# Patient Record
Sex: MALE | Race: WHITE | Employment: OTHER | URBAN - METROPOLITAN AREA
[De-identification: names, ages, dates, MRNs, and addresses within clinical notes are randomized per-mention and may not be internally consistent; named-entity substitution may affect disease eponyms.]

---

## 2017-05-12 PROBLEM — I21.4 NSTEMI (NON-ST ELEVATED MYOCARDIAL INFARCTION) (HCC): Status: ACTIVE | Noted: 2017-05-12

## 2017-08-02 PROBLEM — I25.10 CORONARY ARTERY DISEASE INVOLVING NATIVE CORONARY ARTERY OF NATIVE HEART WITHOUT ANGINA PECTORIS: Status: ACTIVE | Noted: 2017-08-02

## 2017-08-02 PROBLEM — M17.0 LOCALIZED OSTEOARTHRITIS OF KNEES, BILATERAL: Status: ACTIVE | Noted: 2017-08-02

## 2017-09-05 PROBLEM — N40.1 BENIGN PROSTATIC HYPERPLASIA WITH LOWER URINARY TRACT SYMPTOMS: Status: ACTIVE | Noted: 2017-09-05

## 2017-11-22 PROBLEM — R33.9 URINARY RETENTION: Status: ACTIVE | Noted: 2017-11-22

## 2017-11-22 PROBLEM — N47.1 PHIMOSIS: Status: ACTIVE | Noted: 2017-11-22

## 2018-05-08 ENCOUNTER — HOSPITAL ENCOUNTER (EMERGENCY)
Age: 83
Discharge: HOME | End: 2018-05-08
Attending: EMERGENCY MEDICINE
Payer: MEDICARE

## 2018-05-08 VITALS
WEIGHT: 160 LBS | DIASTOLIC BLOOD PRESSURE: 70 MMHG | HEART RATE: 82 BPM | SYSTOLIC BLOOD PRESSURE: 135 MMHG | OXYGEN SATURATION: 96 % | TEMPERATURE: 97.5 F | RESPIRATION RATE: 16 BRPM | BODY MASS INDEX: 24.25 KG/M2 | HEIGHT: 68 IN

## 2018-05-08 DIAGNOSIS — T83.9XXA FOLEY CATHETER PROBLEM, INITIAL ENCOUNTER (HCC): ICD-10-CM

## 2018-05-08 DIAGNOSIS — N30.01 ACUTE CYSTITIS WITH HEMATURIA: Primary | ICD-10-CM

## 2018-05-08 LAB
BACTERIA: ABNORMAL /HPF
BILIRUBIN URINE: NEGATIVE
BLOOD, URINE: ABNORMAL
CLARITY: ABNORMAL
COLOR: YELLOW
GLUCOSE URINE: NEGATIVE MG/DL
KETONES, URINE: NEGATIVE MG/DL
LEUKOCYTE ESTERASE, URINE: ABNORMAL
NITRITE, URINE: NEGATIVE
PH UA: 5.5 (ref 5–9)
PROTEIN UA: 30 MG/DL
RBC UA: ABNORMAL /HPF (ref 0–2)
SPECIFIC GRAVITY UA: 1.02 (ref 1–1.03)
UROBILINOGEN, URINE: 1 E.U./DL
WBC UA: >20 /HPF (ref 0–5)

## 2018-05-08 PROCEDURE — 51702 INSERT TEMP BLADDER CATH: CPT

## 2018-05-08 PROCEDURE — 87088 URINE BACTERIA CULTURE: CPT

## 2018-05-08 PROCEDURE — 81001 URINALYSIS AUTO W/SCOPE: CPT

## 2018-05-08 PROCEDURE — 99284 EMERGENCY DEPT VISIT MOD MDM: CPT

## 2018-05-08 RX ORDER — CEFDINIR 300 MG/1
300 CAPSULE ORAL 2 TIMES DAILY
Qty: 28 CAPSULE | Refills: 0 | Status: SHIPPED | OUTPATIENT
Start: 2018-05-08 | End: 2018-05-22

## 2018-05-09 LAB — URINE CULTURE, ROUTINE: NORMAL

## 2018-07-26 ENCOUNTER — OFFICE VISIT (OUTPATIENT)
Dept: CARDIOLOGY CLINIC | Age: 83
End: 2018-07-26
Payer: MEDICARE

## 2018-07-26 VITALS
RESPIRATION RATE: 12 BRPM | SYSTOLIC BLOOD PRESSURE: 102 MMHG | HEIGHT: 70 IN | HEART RATE: 80 BPM | DIASTOLIC BLOOD PRESSURE: 60 MMHG

## 2018-07-26 DIAGNOSIS — R33.9 URINARY RETENTION: ICD-10-CM

## 2018-07-26 DIAGNOSIS — I44.1 MOBITZ TYPE 1 SECOND DEGREE AV BLOCK: ICD-10-CM

## 2018-07-26 DIAGNOSIS — Z96.0 URINARY CATHETER IN PLACE: ICD-10-CM

## 2018-07-26 DIAGNOSIS — I25.10 CORONARY ARTERY DISEASE INVOLVING NATIVE CORONARY ARTERY OF NATIVE HEART WITHOUT ANGINA PECTORIS: Primary | ICD-10-CM

## 2018-07-26 DIAGNOSIS — I10 ESSENTIAL HYPERTENSION: ICD-10-CM

## 2018-07-26 PROCEDURE — 99213 OFFICE O/P EST LOW 20 MIN: CPT | Performed by: INTERNAL MEDICINE

## 2018-07-26 PROCEDURE — 93000 ELECTROCARDIOGRAM COMPLETE: CPT | Performed by: INTERNAL MEDICINE

## 2018-07-26 PROCEDURE — 4040F PNEUMOC VAC/ADMIN/RCVD: CPT | Performed by: INTERNAL MEDICINE

## 2018-07-26 PROCEDURE — G8427 DOCREV CUR MEDS BY ELIG CLIN: HCPCS | Performed by: INTERNAL MEDICINE

## 2018-07-26 PROCEDURE — 1123F ACP DISCUSS/DSCN MKR DOCD: CPT | Performed by: INTERNAL MEDICINE

## 2018-07-26 PROCEDURE — 1101F PT FALLS ASSESS-DOCD LE1/YR: CPT | Performed by: INTERNAL MEDICINE

## 2018-07-26 PROCEDURE — 1036F TOBACCO NON-USER: CPT | Performed by: INTERNAL MEDICINE

## 2018-07-26 PROCEDURE — G8598 ASA/ANTIPLAT THER USED: HCPCS | Performed by: INTERNAL MEDICINE

## 2018-07-26 PROCEDURE — G8420 CALC BMI NORM PARAMETERS: HCPCS | Performed by: INTERNAL MEDICINE

## 2018-07-26 RX ORDER — PHENAZOPYRIDINE HYDROCHLORIDE 100 MG/1
100 TABLET, FILM COATED ORAL 3 TIMES DAILY PRN
COMMUNITY

## 2018-07-26 NOTE — PROGRESS NOTES
OFFICE VISIT     PRIMARY CARE PHYSICIAN:      Lydia Devine MD       ALLERGIES / SENSITIVITIES:      No Known Allergies       REVIEWED MEDICATIONS:        Current Outpatient Prescriptions:     phenazopyridine (PYRIDIUM) 100 MG tablet, Take 100 mg by mouth 3 times daily as needed for Pain, Disp: , Rfl:     atorvastatin (LIPITOR) 40 MG tablet, TAKE 1 TABLET BY MOUTH EVERY NIGHT, Disp: 30 tablet, Rfl: 3    tamsulosin (FLOMAX) 0.4 MG capsule, Take 1 capsule by mouth daily, Disp: 30 capsule, Rfl: 3    metoprolol tartrate (LOPRESSOR) 25 MG tablet, Take 0.5 tablets by mouth 2 times daily, Disp: 60 tablet, Rfl: 3    gabapentin (NEURONTIN) 100 MG capsule, Take 1 capsule by mouth 2 times daily for 30 days. , Disp: 60 capsule, Rfl: 3    aspirin 81 MG tablet, Take 1 tablet by mouth daily (Patient taking differently: Take 81 mg by mouth every morning ), Disp: 30 tablet, Rfl: 11    lansoprazole (PREVACID 24HR) 15 MG capsule, Take 15 mg by mouth every morning , Disp: , Rfl:     docusate sodium (COLACE) 100 MG capsule, Take 100 mg by mouth every morning, Disp: , Rfl:     bisacodyl (DULCOLAX) 5 MG EC tablet, Take 5 mg by mouth every morning, Disp: , Rfl:       S: REASON FOR VISIT:       Chief Complaint   Patient presents with    Coronary Artery Disease     annual          History of Present Illness:       Office Visit for follow up of CAD, HTN   No hospitalizations or surgeries since last visit   Does exercise at home with his Thena Roch any exertional chest pain or short of breath   No palpitations, dizzy or syncope.    No orthopnea   Compliant with all medications       Past Medical History:   Diagnosis Date    Acid reflux     CAD (coronary artery disease)     Hypertension     NSTEMI (non-ST elevated myocardial infarction) ([facility] [address] 05/2017    Sinus bradycardia 05/2017    Post op ACB            Past Surgical History:   Procedure Laterality Date    HERNIA REPAIR      VARICOSE VEIN SURGERY            Family marely            A/P:   ASSESSMENT / PLAN:    Fany Fontenot was seen today for coronary artery disease. Diagnoses and all orders for this visit:    Coronary artery disease involving native coronary artery of native heart without angina pectoris; NSTEMI 5/2017, Continue  ASA, BB, statins  -     EKG 12 Lead    Mobitz type 1 second degree AV block; Occ; asymtoamatic    Essential hypertension; Stable    Urinary retention: follows with Dr Windy Duran    Urinary catheter in place     Preventive Cardiology: Low cholesterol diet, regular exercise as tolerate, and gradual weight loss discussed. Monitor BP and heart rates. D/w his Son   All questions answered about cardiac diagnoses and cardiac medications. Continue current medications. Risk factor modification based on risk profile discussed. Call if any exertional chest pain, short of breath, dizzy or palpitations   Follow up as needed.          [facility]  [address], [place], [address] [facility]  [phone]

## 2018-08-06 ENCOUNTER — HOSPITAL ENCOUNTER (OUTPATIENT)
Age: 83
Discharge: HOME | End: 2018-08-08
Payer: MEDICARE

## 2018-08-06 LAB
AMORPHOUS: ABNORMAL
BACTERIA: ABNORMAL /HPF
BILIRUBIN URINE: NEGATIVE
BLOOD, URINE: ABNORMAL
CLARITY: ABNORMAL
COLOR: YELLOW
CRYSTALS, UA: ABNORMAL
GLUCOSE URINE: NEGATIVE MG/DL
KETONES, URINE: NEGATIVE MG/DL
LEUKOCYTE ESTERASE, URINE: ABNORMAL
NITRITE, URINE: POSITIVE
PH UA: 8.5 (ref 5–9)
PROTEIN UA: 30 MG/DL
RBC UA: ABNORMAL /HPF (ref 0–2)
SPECIFIC GRAVITY UA: <=1.005 (ref 1–1.03)
UROBILINOGEN, URINE: 0.2 E.U./DL
WBC UA: ABNORMAL /HPF (ref 0–5)

## 2018-08-06 PROCEDURE — 87088 URINE BACTERIA CULTURE: CPT

## 2018-08-06 PROCEDURE — 81001 URINALYSIS AUTO W/SCOPE: CPT

## 2018-08-07 LAB — URINE CULTURE, ROUTINE: NORMAL

## 2018-09-24 PROBLEM — I21.4 NSTEMI (NON-ST ELEVATED MYOCARDIAL INFARCTION) (HCC): Status: RESOLVED | Noted: 2017-05-12 | Resolved: 2018-09-24

## 2019-01-01 ENCOUNTER — APPOINTMENT (OUTPATIENT)
Dept: CT IMAGING | Age: 84
End: 2019-01-01
Payer: MEDICARE

## 2019-01-01 ENCOUNTER — HOSPITAL ENCOUNTER (OUTPATIENT)
Age: 84
Setting detail: OBSERVATION
Discharge: HOME | End: 2019-07-27
Attending: EMERGENCY MEDICINE | Admitting: STUDENT IN AN ORGANIZED HEALTH CARE EDUCATION/TRAINING PROGRAM
Payer: MEDICARE

## 2019-01-01 ENCOUNTER — APPOINTMENT (OUTPATIENT)
Dept: GENERAL RADIOLOGY | Age: 84
End: 2019-01-01
Payer: MEDICARE

## 2019-01-01 VITALS
SYSTOLIC BLOOD PRESSURE: 128 MMHG | TEMPERATURE: 97.7 F | BODY MASS INDEX: 25.07 KG/M2 | WEIGHT: 165.4 LBS | RESPIRATION RATE: 16 BRPM | HEIGHT: 68 IN | DIASTOLIC BLOOD PRESSURE: 62 MMHG | OXYGEN SATURATION: 96 % | HEART RATE: 81 BPM

## 2019-01-01 DIAGNOSIS — I48.91 NEW ONSET ATRIAL FIBRILLATION (HCC): Primary | ICD-10-CM

## 2019-01-01 LAB
ALBUMIN SERPL-MCNC: 4 G/DL (ref 3.5–5.2)
ALP BLD-CCNC: 76 U/L (ref 40–129)
ALT SERPL-CCNC: 19 U/L (ref 0–40)
ANION GAP SERPL CALCULATED.3IONS-SCNC: 12 MMOL/L (ref 7–16)
ANION GAP SERPL CALCULATED.3IONS-SCNC: 13 MMOL/L (ref 7–16)
AST SERPL-CCNC: 18 U/L (ref 0–39)
BACTERIA: ABNORMAL /HPF
BASOPHILS ABSOLUTE: 0.08 E9/L (ref 0–0.2)
BASOPHILS RELATIVE PERCENT: 0.6 % (ref 0–2)
BILIRUB SERPL-MCNC: 0.6 MG/DL (ref 0–1.2)
BILIRUBIN URINE: NEGATIVE
BLOOD CULTURE, ROUTINE: NORMAL
BLOOD, URINE: ABNORMAL
BUN BLDV-MCNC: 17 MG/DL (ref 8–23)
BUN BLDV-MCNC: 17 MG/DL (ref 8–23)
CALCIUM SERPL-MCNC: 9 MG/DL (ref 8.6–10.2)
CALCIUM SERPL-MCNC: 9.6 MG/DL (ref 8.6–10.2)
CHLORIDE BLD-SCNC: 102 MMOL/L (ref 98–107)
CHLORIDE BLD-SCNC: 106 MMOL/L (ref 98–107)
CLARITY: CLEAR
CO2: 22 MMOL/L (ref 22–29)
CO2: 23 MMOL/L (ref 22–29)
COLOR: YELLOW
CREAT SERPL-MCNC: 1.2 MG/DL (ref 0.7–1.2)
CREAT SERPL-MCNC: 1.2 MG/DL (ref 0.7–1.2)
CULTURE, BLOOD 2: NORMAL
EKG ATRIAL RATE: 48 BPM
EKG Q-T INTERVAL: 422 MS
EKG QRS DURATION: 112 MS
EKG QTC CALCULATION (BAZETT): 486 MS
EKG R AXIS: -38 DEGREES
EKG T AXIS: 60 DEGREES
EKG VENTRICULAR RATE: 80 BPM
EOSINOPHILS ABSOLUTE: 0.1 E9/L (ref 0.05–0.5)
EOSINOPHILS RELATIVE PERCENT: 0.7 % (ref 0–6)
GFR AFRICAN AMERICAN: >60
GFR AFRICAN AMERICAN: >60
GFR NON-AFRICAN AMERICAN: 56 ML/MIN/1.73
GFR NON-AFRICAN AMERICAN: 56 ML/MIN/1.73
GLUCOSE BLD-MCNC: 149 MG/DL (ref 74–99)
GLUCOSE BLD-MCNC: 96 MG/DL (ref 74–99)
GLUCOSE URINE: NEGATIVE MG/DL
HCT VFR BLD CALC: 42.7 % (ref 37–54)
HCT VFR BLD CALC: 47.5 % (ref 37–54)
HEMOGLOBIN: 14.3 G/DL (ref 12.5–16.5)
HEMOGLOBIN: 16.1 G/DL (ref 12.5–16.5)
IMMATURE GRANULOCYTES #: 0.18 E9/L
IMMATURE GRANULOCYTES %: 1.3 % (ref 0–5)
KETONES, URINE: NEGATIVE MG/DL
LACTIC ACID: 1.8 MMOL/L (ref 0.5–2.2)
LACTIC ACID: 2.7 MMOL/L (ref 0.5–2.2)
LEUKOCYTE ESTERASE, URINE: ABNORMAL
LV EF: 65 %
LVEF MODALITY: NORMAL
LYMPHOCYTES ABSOLUTE: 1.87 E9/L (ref 1.5–4)
LYMPHOCYTES RELATIVE PERCENT: 13.5 % (ref 20–42)
MCH RBC QN AUTO: 30.6 PG (ref 26–35)
MCH RBC QN AUTO: 30.8 PG (ref 26–35)
MCHC RBC AUTO-ENTMCNC: 33.5 % (ref 32–34.5)
MCHC RBC AUTO-ENTMCNC: 33.9 % (ref 32–34.5)
MCV RBC AUTO: 91 FL (ref 80–99.9)
MCV RBC AUTO: 91.4 FL (ref 80–99.9)
MONOCYTES ABSOLUTE: 0.9 E9/L (ref 0.1–0.95)
MONOCYTES RELATIVE PERCENT: 6.5 % (ref 2–12)
NEUTROPHILS ABSOLUTE: 10.72 E9/L (ref 1.8–7.3)
NEUTROPHILS RELATIVE PERCENT: 77.4 % (ref 43–80)
NITRITE, URINE: POSITIVE
ORGANISM: ABNORMAL
ORGANISM: ABNORMAL
PDW BLD-RTO: 13.1 FL (ref 11.5–15)
PDW BLD-RTO: 13.2 FL (ref 11.5–15)
PH UA: 6.5 (ref 5–9)
PLATELET # BLD: 229 E9/L (ref 130–450)
PLATELET # BLD: 267 E9/L (ref 130–450)
PMV BLD AUTO: 9.5 FL (ref 7–12)
PMV BLD AUTO: 9.6 FL (ref 7–12)
POTASSIUM REFLEX MAGNESIUM: 4.4 MMOL/L (ref 3.5–5)
POTASSIUM SERPL-SCNC: 4.2 MMOL/L (ref 3.5–5)
PROTEIN UA: ABNORMAL MG/DL
RBC # BLD: 4.67 E12/L (ref 3.8–5.8)
RBC # BLD: 5.22 E12/L (ref 3.8–5.8)
RBC UA: ABNORMAL /HPF (ref 0–2)
SODIUM BLD-SCNC: 137 MMOL/L (ref 132–146)
SODIUM BLD-SCNC: 141 MMOL/L (ref 132–146)
SPECIFIC GRAVITY UA: 1.01 (ref 1–1.03)
T4 FREE: 1.36 NG/DL (ref 0.93–1.7)
TOTAL PROTEIN: 7.1 G/DL (ref 6.4–8.3)
TROPONIN: <0.01 NG/ML (ref 0–0.03)
TROPONIN: <0.01 NG/ML (ref 0–0.03)
TSH SERPL DL<=0.05 MIU/L-ACNC: 1.2 UIU/ML (ref 0.27–4.2)
URINE CULTURE, ROUTINE: ABNORMAL
UROBILINOGEN, URINE: 1 E.U./DL
WBC # BLD: 12.3 E9/L (ref 4.5–11.5)
WBC # BLD: 13.9 E9/L (ref 4.5–11.5)
WBC UA: ABNORMAL /HPF (ref 0–5)

## 2019-01-01 PROCEDURE — 81001 URINALYSIS AUTO W/SCOPE: CPT

## 2019-01-01 PROCEDURE — 96365 THER/PROPH/DIAG IV INF INIT: CPT

## 2019-01-01 PROCEDURE — 36415 COLL VENOUS BLD VENIPUNCTURE: CPT

## 2019-01-01 PROCEDURE — 84443 ASSAY THYROID STIM HORMONE: CPT

## 2019-01-01 PROCEDURE — 87077 CULTURE AEROBIC IDENTIFY: CPT

## 2019-01-01 PROCEDURE — 84439 ASSAY OF FREE THYROXINE: CPT

## 2019-01-01 PROCEDURE — 93010 ELECTROCARDIOGRAM REPORT: CPT | Performed by: INTERNAL MEDICINE

## 2019-01-01 PROCEDURE — 85025 COMPLETE CBC W/AUTO DIFF WBC: CPT

## 2019-01-01 PROCEDURE — 93005 ELECTROCARDIOGRAM TRACING: CPT | Performed by: NURSE PRACTITIONER

## 2019-01-01 PROCEDURE — 6370000000 HC RX 637 (ALT 250 FOR IP): Performed by: HOSPITALIST

## 2019-01-01 PROCEDURE — 97530 THERAPEUTIC ACTIVITIES: CPT | Performed by: PHYSICAL THERAPIST

## 2019-01-01 PROCEDURE — 83605 ASSAY OF LACTIC ACID: CPT

## 2019-01-01 PROCEDURE — 99218 PR INITIAL OBSERVATION CARE/DAY 30 MINUTES: CPT | Performed by: EMERGENCY MEDICINE

## 2019-01-01 PROCEDURE — 85027 COMPLETE CBC AUTOMATED: CPT

## 2019-01-01 PROCEDURE — 2580000003 HC RX 258: Performed by: STUDENT IN AN ORGANIZED HEALTH CARE EDUCATION/TRAINING PROGRAM

## 2019-01-01 PROCEDURE — 96376 TX/PRO/DX INJ SAME DRUG ADON: CPT

## 2019-01-01 PROCEDURE — 6360000002 HC RX W HCPCS: Performed by: HOSPITALIST

## 2019-01-01 PROCEDURE — 6360000002 HC RX W HCPCS: Performed by: STUDENT IN AN ORGANIZED HEALTH CARE EDUCATION/TRAINING PROGRAM

## 2019-01-01 PROCEDURE — 97530 THERAPEUTIC ACTIVITIES: CPT

## 2019-01-01 PROCEDURE — 84484 ASSAY OF TROPONIN QUANT: CPT

## 2019-01-01 PROCEDURE — 99285 EMERGENCY DEPT VISIT HI MDM: CPT

## 2019-01-01 PROCEDURE — 87088 URINE BACTERIA CULTURE: CPT

## 2019-01-01 PROCEDURE — 2580000003 HC RX 258: Performed by: HOSPITALIST

## 2019-01-01 PROCEDURE — 6360000002 HC RX W HCPCS: Performed by: NURSE PRACTITIONER

## 2019-01-01 PROCEDURE — 70450 CT HEAD/BRAIN W/O DYE: CPT

## 2019-01-01 PROCEDURE — 2580000003 HC RX 258: Performed by: NURSE PRACTITIONER

## 2019-01-01 PROCEDURE — G0378 HOSPITAL OBSERVATION PER HR: HCPCS

## 2019-01-01 PROCEDURE — 97161 PT EVAL LOW COMPLEX 20 MIN: CPT | Performed by: PHYSICAL THERAPIST

## 2019-01-01 PROCEDURE — 87040 BLOOD CULTURE FOR BACTERIA: CPT

## 2019-01-01 PROCEDURE — 97165 OT EVAL LOW COMPLEX 30 MIN: CPT

## 2019-01-01 PROCEDURE — 96372 THER/PROPH/DIAG INJ SC/IM: CPT

## 2019-01-01 PROCEDURE — 6370000000 HC RX 637 (ALT 250 FOR IP): Performed by: STUDENT IN AN ORGANIZED HEALTH CARE EDUCATION/TRAINING PROGRAM

## 2019-01-01 PROCEDURE — 99214 OFFICE O/P EST MOD 30 MIN: CPT | Performed by: INTERNAL MEDICINE

## 2019-01-01 PROCEDURE — APPSS60 APP SPLIT SHARED TIME 46-60 MINUTES: Performed by: NURSE PRACTITIONER

## 2019-01-01 PROCEDURE — 80053 COMPREHEN METABOLIC PANEL: CPT

## 2019-01-01 PROCEDURE — 71045 X-RAY EXAM CHEST 1 VIEW: CPT

## 2019-01-01 PROCEDURE — 87186 SC STD MICRODIL/AGAR DIL: CPT

## 2019-01-01 PROCEDURE — 80048 BASIC METABOLIC PNL TOTAL CA: CPT

## 2019-01-01 PROCEDURE — 93306 TTE W/DOPPLER COMPLETE: CPT

## 2019-01-01 RX ORDER — TAMSULOSIN HYDROCHLORIDE 0.4 MG/1
0.4 CAPSULE ORAL DAILY
Status: DISCONTINUED | OUTPATIENT
Start: 2019-01-01 | End: 2019-01-01 | Stop reason: HOSPADM

## 2019-01-01 RX ORDER — ASPIRIN 81 MG/1
81 TABLET, CHEWABLE ORAL DAILY
Status: DISCONTINUED | OUTPATIENT
Start: 2019-01-01 | End: 2019-01-01 | Stop reason: HOSPADM

## 2019-01-01 RX ORDER — ATORVASTATIN CALCIUM 40 MG/1
40 TABLET, FILM COATED ORAL NIGHTLY
Status: DISCONTINUED | OUTPATIENT
Start: 2019-01-01 | End: 2019-01-01 | Stop reason: HOSPADM

## 2019-01-01 RX ORDER — PANTOPRAZOLE SODIUM 40 MG/1
40 TABLET, DELAYED RELEASE ORAL
Status: DISCONTINUED | OUTPATIENT
Start: 2019-01-01 | End: 2019-01-01 | Stop reason: HOSPADM

## 2019-01-01 RX ORDER — SODIUM CHLORIDE 0.9 % (FLUSH) 0.9 %
10 SYRINGE (ML) INJECTION EVERY 12 HOURS SCHEDULED
Status: DISCONTINUED | OUTPATIENT
Start: 2019-01-01 | End: 2019-01-01 | Stop reason: HOSPADM

## 2019-01-01 RX ORDER — CEFUROXIME AXETIL 250 MG/1
250 TABLET ORAL 2 TIMES DAILY
Qty: 14 TABLET | Refills: 0 | Status: SHIPPED | OUTPATIENT
Start: 2019-01-01 | End: 2019-01-01

## 2019-01-01 RX ORDER — MONTELUKAST SODIUM 10 MG/1
10 TABLET ORAL DAILY
Status: DISCONTINUED | OUTPATIENT
Start: 2019-01-01 | End: 2019-01-01 | Stop reason: HOSPADM

## 2019-01-01 RX ORDER — GABAPENTIN 100 MG/1
100 CAPSULE ORAL 2 TIMES DAILY
Status: DISCONTINUED | OUTPATIENT
Start: 2019-01-01 | End: 2019-01-01 | Stop reason: HOSPADM

## 2019-01-01 RX ORDER — SODIUM CHLORIDE 0.9 % (FLUSH) 0.9 %
10 SYRINGE (ML) INJECTION PRN
Status: DISCONTINUED | OUTPATIENT
Start: 2019-01-01 | End: 2019-01-01 | Stop reason: HOSPADM

## 2019-01-01 RX ORDER — 0.9 % SODIUM CHLORIDE 0.9 %
500 INTRAVENOUS SOLUTION INTRAVENOUS ONCE
Status: COMPLETED | OUTPATIENT
Start: 2019-01-01 | End: 2019-01-01

## 2019-01-01 RX ORDER — ONDANSETRON 2 MG/ML
4 INJECTION INTRAMUSCULAR; INTRAVENOUS EVERY 6 HOURS PRN
Status: DISCONTINUED | OUTPATIENT
Start: 2019-01-01 | End: 2019-01-01 | Stop reason: HOSPADM

## 2019-01-01 RX ORDER — GABAPENTIN 100 MG/1
100 CAPSULE ORAL 2 TIMES DAILY
COMMUNITY

## 2019-01-01 RX ORDER — DOCUSATE SODIUM 100 MG/1
100 CAPSULE, LIQUID FILLED ORAL EVERY MORNING
Status: DISCONTINUED | OUTPATIENT
Start: 2019-01-01 | End: 2019-01-01 | Stop reason: HOSPADM

## 2019-01-01 RX ADMIN — DOCUSATE SODIUM 100 MG: 100 CAPSULE, LIQUID FILLED ORAL at 08:16

## 2019-01-01 RX ADMIN — GABAPENTIN 100 MG: 100 CAPSULE ORAL at 22:21

## 2019-01-01 RX ADMIN — ATORVASTATIN CALCIUM 40 MG: 40 TABLET, FILM COATED ORAL at 20:50

## 2019-01-01 RX ADMIN — Medication 10 ML: at 08:16

## 2019-01-01 RX ADMIN — DOCUSATE SODIUM 100 MG: 100 CAPSULE, LIQUID FILLED ORAL at 10:25

## 2019-01-01 RX ADMIN — ENOXAPARIN SODIUM 40 MG: 40 INJECTION SUBCUTANEOUS at 22:21

## 2019-01-01 RX ADMIN — BISACODYL 5 MG: 5 TABLET, COATED ORAL at 08:16

## 2019-01-01 RX ADMIN — PETROLATUM: 42 OINTMENT TOPICAL at 08:49

## 2019-01-01 RX ADMIN — GABAPENTIN 100 MG: 100 CAPSULE ORAL at 10:25

## 2019-01-01 RX ADMIN — TAMSULOSIN HYDROCHLORIDE 0.4 MG: 0.4 CAPSULE ORAL at 10:25

## 2019-01-01 RX ADMIN — BISACODYL 5 MG: 5 TABLET, COATED ORAL at 10:25

## 2019-01-01 RX ADMIN — GABAPENTIN 100 MG: 100 CAPSULE ORAL at 08:16

## 2019-01-01 RX ADMIN — ASPIRIN 81 MG 81 MG: 81 TABLET ORAL at 08:16

## 2019-01-01 RX ADMIN — PANTOPRAZOLE SODIUM 40 MG: 40 TABLET, DELAYED RELEASE ORAL at 06:08

## 2019-01-01 RX ADMIN — ENOXAPARIN SODIUM 40 MG: 40 INJECTION SUBCUTANEOUS at 10:24

## 2019-01-01 RX ADMIN — ENOXAPARIN SODIUM 40 MG: 40 INJECTION SUBCUTANEOUS at 08:16

## 2019-01-01 RX ADMIN — CEFTRIAXONE SODIUM 1 G: 1 INJECTION, POWDER, FOR SOLUTION INTRAMUSCULAR; INTRAVENOUS at 17:41

## 2019-01-01 RX ADMIN — Medication 10 ML: at 18:40

## 2019-01-01 RX ADMIN — TAMSULOSIN HYDROCHLORIDE 0.4 MG: 0.4 CAPSULE ORAL at 08:16

## 2019-01-01 RX ADMIN — ASPIRIN 81 MG 81 MG: 81 TABLET ORAL at 10:25

## 2019-01-01 RX ADMIN — SODIUM CHLORIDE 500 ML: 9 INJECTION, SOLUTION INTRAVENOUS at 16:17

## 2019-01-01 RX ADMIN — CEFTRIAXONE SODIUM 1 G: 1 INJECTION, POWDER, FOR SOLUTION INTRAMUSCULAR; INTRAVENOUS at 18:40

## 2019-01-01 RX ADMIN — MONTELUKAST SODIUM 10 MG: 10 TABLET, FILM COATED ORAL at 08:16

## 2019-01-01 RX ADMIN — PETROLATUM: 42 OINTMENT TOPICAL at 20:50

## 2019-01-01 RX ADMIN — PETROLATUM: 42 OINTMENT TOPICAL at 12:35

## 2019-01-01 RX ADMIN — PANTOPRAZOLE SODIUM 40 MG: 40 TABLET, DELAYED RELEASE ORAL at 06:18

## 2019-01-01 RX ADMIN — Medication 10 ML: at 10:25

## 2019-01-01 RX ADMIN — Medication 10 ML: at 20:50

## 2019-01-01 RX ADMIN — PETROLATUM: 42 OINTMENT TOPICAL at 22:21

## 2019-01-01 RX ADMIN — MONTELUKAST SODIUM 10 MG: 10 TABLET, FILM COATED ORAL at 10:25

## 2019-01-01 RX ADMIN — Medication 10 ML: at 22:21

## 2019-01-01 RX ADMIN — GABAPENTIN 100 MG: 100 CAPSULE ORAL at 20:50

## 2019-01-01 ASSESSMENT — PAIN SCALES - GENERAL
PAINLEVEL_OUTOF10: 0

## 2019-07-25 PROBLEM — I48.91 A-FIB (HCC): Status: ACTIVE | Noted: 2019-01-01

## 2019-07-25 NOTE — ED NOTES
Bed: 39  Expected date:   Expected time:   Means of arrival:   Comments:  ems     Adolfo Valera RN  07/25/19 9423

## 2019-07-25 NOTE — ED PROVIDER NOTES
ED Attending  CC: Yoon           Department of Emergency Medicine   ED  Provider Note  Admit Date/RoomTime: 7/25/2019  1:24 PM  ED Room: 39/39  MRN: [id number]  Chief Complaint: Fatigue (sent by son who takes care of patient. weak today. possible future hospice placement as stated by son to ems)       History of Present Illness   Source of history provided by:  patient and son. History/Exam Limitations: none. Sindi Lopez is a 80 y.o. male who has a past medical history of:   Past Medical History:   Diagnosis Date    Acid reflux     CAD (coronary artery disease)     Hypertension     NSTEMI (non-ST elevated myocardial infarction) ([facility] [address] 05/2017    Sinus bradycardia 05/2017    Post op ACB    presents to the ED for complaint of weakness. Son is at bedside and is the caregiver. Reports that visiting physician was just there yesterday. The past 2 to 3 days they have noticed some increase in weakness. Presently they are attempting to get hospice care for assistance at home. According to the son today he was assisting him in the bathroom and his father was unable to get up off the toilet, so he had to call for EMS assistance. He states that he has had some intermittent difficulty using his right arm and came in to have him evaluated for CVA. Reports some intermittent shortness of breath on exertion. He does not use oxygen at home. Denies fever, chills, chest pain, difficulty speaking, diplopia, visual changes, headache, abdominal pain, nausea, vomiting, diarrhea, constipation, calf pain, dizziness, or syncope. ROS    Pertinent positives and negatives are stated within HPI, all other systems reviewed and are negative. Past Surgical History:   Procedure Laterality Date    HERNIA REPAIR      VARICOSE VEIN SURGERY     Social History:  reports that he has never smoked. He has never used smokeless tobacco. He reports that he does not drink alcohol or use drugs.   Family History: family history the brain would be recommended   for more detailed evaluation. .   2. 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 3. Vascular calcifications within the bilateral internal carotid   artery cavernous segments. .   4. Moderate to moderately severe cerebral volume loss/atrophy with   white matter changes suggestive of sequelae small vessel ischemic   disease. XR CHEST PORTABLE   Final Result   Suboptimal inspiration. Mild opacities at the lung bases possibly   atelectasis. EKG #1:  Interpreted by emergency department physician unless otherwise noted. Time:  1449    Rate: 80  Rhythm: Atrial fibrillation. Interpretation: Atrial fibrillation with PVC's  Compared to EKG from 11/22/2017  ED Course / Medical Decision Making   Medications - No data to display     Re-examination:  7/25/19       No change in patient condition. Will plan for admission due to new onset of Afib. Son is at bedside and confirms that he no longer sees Dr Fernando Perez  Spoke to Dr Perlita Nelson and he accepts admission     Consult(s):   None    Procedure(s):   none    MDM:   Jarrod Aguila is 8 year old male who presents to the ED for complaint of increased weakness for the past 2 days. He lives with his son who is his primary caregiver. He is being seen by visiting physicians. He is weak and frail appearing, without unilateral weakness. CT head negative for any acute process. CBC: wbc 13.9, H&H 16.1/47. 5. CMP unremarkable. Troponin < 0.01, EKG Afib with controlled ventricular response. Will hold on anticoagulation due to patients age, cardiology for further evaluation and plan. . Lactic acid 2.7, hydrated with saline in the ED. UA: positive nitrite and leukocytes. Culture pending. started on Rocephin. Results were discussed with patient and son and plan for admission. Verbalized understanding and agrees with plan. Counseling:     The emergency provider has spoken with the patient and son and discussed todays results, in addition to providing specific details for the plan of care and counseling regarding the diagnosis and prognosis. Questions are answered at this time and they are agreeable with the plan. Assessment    No diagnosis found. Plan   Admit to telemetry  Patient condition is stable    New Medications     New Prescriptions    No medications on file     Electronically signed by ROC Santiago CNP   DD: 7/25/19  **This report was transcribed using voice recognition software. Every effort was made to ensure accuracy; however, inadvertent computerized transcription errors may be present.   END OF ED PROVIDER NOTE     ROC Santiago CNP  07/26/19 6296

## 2019-07-26 NOTE — PROGRESS NOTES
[x]  Therapeutic Exercise  [x]  Visual/Perceptual: []    Delirium prevention/treatment  []   Other:  []    Rehab Potential: Good for established goals     Patient / Family Goal: None stated     Patient and/or family were instructed diagnosis, prognosis/goals and plan of care. Demonstrated fair understanding.     Tx Time in: 138  Tx Time out: 155  Evaluation + 17 treatment time    Conception Gigi OTR/L [id number]

## 2019-07-26 NOTE — H&P
Hospital Medicine History & Physical      PCP: ROC Estevez CNP    Date of Admission: 7/25/2019    Date of Service:  7-25-19    Chief Complaint:  fatique      History Of Present Illness:      81 yo male hx gerd bph htn cad came to ER with weakness of 3 days, family working on getting him to home hospice presently, found to have afib/rvr new problem and being admitted, ct head no acute changes, cxr clear, ekg afib  Son at bedside, pt is DNR CC    Past Medical History:          Diagnosis Date    Acid reflux     CAD (coronary artery disease)     Hypertension     NSTEMI (non-ST elevated myocardial infarction) ([facility] [address] 05/2017    Sinus bradycardia 05/2017    Post op ACB       Past Surgical History:          Procedure Laterality Date    HERNIA REPAIR      VARICOSE VEIN SURGERY         Medications Prior to Admission:      Prior to Admission medications    Medication Sig Start Date End Date Taking? Authorizing Provider   gabapentin (NEURONTIN) 100 MG capsule Take 100 mg by mouth 2 times daily.    Yes Historical Provider, MD   atorvastatin (LIPITOR) 40 MG tablet TAKE 1 TABLET BY MOUTH EVERY NIGHT 2/21/19  Yes Susana Taylor MD   montelukast (SINGULAIR) 10 MG tablet Take 1 tablet by mouth daily 9/24/18  Yes Susana Taylor MD   phenazopyridine (PYRIDIUM) 100 MG tablet Take 100 mg by mouth 3 times daily as needed for Pain   Yes Historical Provider, MD   tamsulosin (FLOMAX) 0.4 MG capsule Take 1 capsule by mouth daily 3/25/18  Yes Susana Taylor MD   metoprolol tartrate (LOPRESSOR) 25 MG tablet Take 0.5 tablets by mouth 2 times daily 3/25/18  Yes Susana Taylor MD   docusate sodium (COLACE) 100 MG capsule Take 100 mg by mouth every morning   Yes Historical Provider, MD   bisacodyl (DULCOLAX) 5 MG EC tablet Take 5 mg by mouth every morning   Yes Historical Provider, MD   aspirin 81 MG tablet Take 1 tablet by mouth daily  Patient taking differently: Take 81 mg by mouth every morning

## 2019-07-26 NOTE — PROGRESS NOTES
[facility]  Physical Therapy Initial Evaluation    Name: Keisha Chaudhary  : 5/10/1919  MRN: [id number]    Date of Service: 2019        Evaluating Therapist: Selina Simmonds PT, DPT       Equipment Recommendations: to be determined. Room #: 8249/2341-N  DIAGNOSIS: AFIB   PRECAUTIONS: fall risk, [place]    Social:  Pt lives with son in a 2 floor plan ramped entrance. Prior to admission pt walked with w/w with w/c follow per pt. Initial Evaluation  Date:  Treatment      Short Term/ Long Term   Goals   Was pt agreeable to Eval/treatment? yes     Does pt have pain? No c/o pain     Bed Mobility  Rolling: SBA  Supine to sit: SBA  Sit to supine: SBA  Scooting: NT  supervision   Transfers Sit to stand: max A x 2 - pt unable to come to full stand. Stand to sit: max A x 2  Stand pivot: NT  Mod A    Ambulation    NT  3+ feet with w/w with mod A    Stair negotiation: ascended and descended  NT  NT   AM-PAC Raw Score 10/24       Pt is alert and Oriented x2  ROM:  L LE grossly WFL  R LE grossly WFL  Strength:   L LE grossly 3/5  R LE grossly 3/5  Balance: standing max A x 2, sitting static SBA   Sensation: no c/o numbness/tingling. Edema: none noted. Endurance: poor       ASSESSMENT  Pt displays functional ability as noted in the objective portion of this evaluation. Comments/Treatment:  Pt found laying in bed agreeable to evaluation. Pt instructed in mobility. Pt pleasant and motivated. Attempted sit to stand 2 times but pt unable to come to full standing position. Pt left laying in bed with call button in reach end of evaluation. Patient education  Pt educated on mobility. Patient response to education:   Pt verbalized understanding Pt demonstrated skill Pt requires further education in this area     x     Rehab potential is Good for reaching above PT goals. Pts/ family goals   1. None stated. Patient and or family understand(s) diagnosis, prognosis, and plan of care.     PLAN  PT care will be provided in accordance with the objectives noted above. Whenever appropriate, clear delegation orders will be provided for nursing staff. Exercises and functional mobility practice will be used as well as appropriate assistive devices or modalities to obtain goals. Patient and family education will also be administered as needed. Frequency of treatments will be 2-5x/week x 3-6 days.     Time in: 1330  Time out: 1352  Evaluation + 10  minutes tx time    Jarad Lugo PT, DPT   License number:  [id number]

## 2019-07-26 NOTE — PROGRESS NOTES
flush, magnesium hydroxide, ondansetron    I/O    Intake/Output Summary (Last 24 hours) at 7/26/2019 1710  Last data filed at 7/26/2019 1342  Gross per 24 hour   Intake 720 ml   Output 1225 ml   Net -505 ml       Labs:   Recent Labs     07/25/19  1456 07/26/19  0321   WBC 13.9* 12.3*   HGB 16.1 14.3   HCT 47.5 42.7    229       Recent Labs     07/25/19  1456 07/26/19  0321    141   K 4.4 4.2    106   CO2 22 23   BUN 17 17   CREATININE 1.2 1.2   CALCIUM 9.6 9.0       Recent Labs     07/25/19  1456   PROT 7.1   ALKPHOS 76   ALT 19   AST 18   BILITOT 0.6       No results for input(s): INR in the last 72 hours. Recent Labs     07/25/19  1456 07/26/19  0321   TROPONINI <0.01 <0.01       Chronic labs:  Lab Results   Component Value Date    CHOL 142 09/05/2017    TRIG 106 09/05/2017    HDL 54 09/05/2017    LDLCALC 69 09/05/2017    TSH 1.200 07/26/2019    PSA 2.3 09/05/2017    INR 1.1 11/22/2017    LABA1C 5.2 05/12/2017       Radiology:  Imaging studies reviewed today. ASSESSMENT:    New onset A. fib with RVR  Hypertension  Hyperlipidemia  CAD  BPH  GERD  UTI  DNR CC status     PLAN:    Await echo  Cardio evaluation pending  Given CC would avoid aggressive measures and evaluations  Will follow along. Diet: DIET CARB CONTROL;  Code Status: DNR-CC    +++++++++++++++++++++++++++++++++++++++++++++++++  Jared Kwon MD   [facility].  +++++++++++++++++++++++++++++++++++++++++++++++++  NOTE: This report was transcribed using voice recognition software. Every effort was made to ensure accuracy; however, inadvertent computerized transcription errors may be present.

## 2019-07-27 PROBLEM — R53.81 PHYSICAL DECONDITIONING: Status: ACTIVE | Noted: 2019-01-01

## 2019-07-27 NOTE — PLAN OF CARE
Problem: Risk for Impaired Skin Integrity  Goal: Tissue integrity - skin and mucous membranes  Description  Structural intactness and normal physiological function of skin and  mucous membranes.   Outcome: Met This Shift

## 2019-07-27 NOTE — DISCHARGE SUMMARY
(A)   WBC, UA Latest Ref Range: 0 - 5 /HPF 2-5   RBC, UA Latest Ref Range: 0 - 2 /HPF 0-1   Bacteria, UA Latest Units: /HPF MODERATE (A)     Disposition:  Home with [facility]   Already follows with visiting physicians     Discharge Instructions/Follow-up:    Plan for Hospice as outpatient per visiting physicians at home     Code Status:  DNR-CC     Activity: activity as tolerated    Diet: regular diet    Labs: For convenience and continuity at follow-up the following most recent labs are provided:      CBC:    Lab Results   Component Value Date    WBC 12.3 07/26/2019    HGB 14.3 07/26/2019    HCT 42.7 07/26/2019     07/26/2019       Renal:    Lab Results   Component Value Date     07/26/2019    K 4.2 07/26/2019    K 4.4 07/25/2019     07/26/2019    CO2 23 07/26/2019    BUN 17 07/26/2019    CREATININE 1.2 07/26/2019    CALCIUM 9.0 07/26/2019       Discharge Medications:     Current Discharge Medication List           Details   cefUROXime (CEFTIN) 250 MG tablet Take 1 tablet by mouth 2 times daily for 7 days  Qty: 14 tablet, Refills: 0              Details   gabapentin (NEURONTIN) 100 MG capsule Take 100 mg by mouth 2 times daily. atorvastatin (LIPITOR) 40 MG tablet TAKE 1 TABLET BY MOUTH EVERY NIGHT  Qty: 30 tablet, Refills: 0      montelukast (SINGULAIR) 10 MG tablet Take 1 tablet by mouth daily  Qty: 30 tablet, Refills: 3    Associated Diagnoses:  Allergic conjunctivitis of both eyes      phenazopyridine (PYRIDIUM) 100 MG tablet Take 100 mg by mouth 3 times daily as needed for Pain      tamsulosin (FLOMAX) 0.4 MG capsule Take 1 capsule by mouth daily  Qty: 30 capsule, Refills: 3      metoprolol tartrate (LOPRESSOR) 25 MG tablet Take 0.5 tablets by mouth 2 times daily  Qty: 60 tablet, Refills: 3      docusate sodium (COLACE) 100 MG capsule Take 100 mg by mouth every morning      bisacodyl (DULCOLAX) 5 MG EC tablet Take 5 mg by mouth every morning      aspirin 81 MG tablet Take 1 tablet by mouth daily  Qty: 30 tablet, Refills: 11    Associated Diagnoses: Coronary artery disease involving native coronary artery of native heart without angina pectoris      lansoprazole (PREVACID 24HR) 15 MG capsule Take 15 mg by mouth every morning              Time Spent on discharge is more than 30 minutes in the examination, evaluation, counseling and review of medications and discharge plan. Signed:    ROC Ramírez CNP   7/27/2019      Thank you ROC Pak CNP for the opportunity to be involved in this patient's care. If you have any questions or concerns please feel free to contact me at [phone].